# Patient Record
Sex: FEMALE | Race: WHITE | HISPANIC OR LATINO | Employment: UNEMPLOYED | URBAN - METROPOLITAN AREA
[De-identification: names, ages, dates, MRNs, and addresses within clinical notes are randomized per-mention and may not be internally consistent; named-entity substitution may affect disease eponyms.]

---

## 2017-05-02 PROBLEM — O99.820 GBS (GROUP B STREPTOCOCCUS CARRIER), +RV CULTURE, CURRENTLY PREGNANT: Status: ACTIVE | Noted: 2017-05-02

## 2017-05-02 PROBLEM — Z30.09 FAMILY PLANNING: Status: ACTIVE | Noted: 2017-05-02

## 2017-05-16 ENCOUNTER — ANESTHESIA EVENT (OUTPATIENT)
Dept: OBSTETRICS AND GYNECOLOGY | Facility: HOSPITAL | Age: 32
End: 2017-05-16
Payer: MEDICAID

## 2017-05-16 ENCOUNTER — ANESTHESIA (OUTPATIENT)
Dept: OBSTETRICS AND GYNECOLOGY | Facility: HOSPITAL | Age: 32
End: 2017-05-16
Payer: MEDICAID

## 2017-05-16 ENCOUNTER — SURGERY (OUTPATIENT)
Age: 32
End: 2017-05-16

## 2017-05-16 ENCOUNTER — HOSPITAL ENCOUNTER (INPATIENT)
Facility: HOSPITAL | Age: 32
LOS: 3 days | Discharge: HOME | End: 2017-05-19
Attending: OBSTETRICS & GYNECOLOGY | Admitting: OBSTETRICS & GYNECOLOGY
Payer: MEDICAID

## 2017-05-16 DIAGNOSIS — Z34.83 PRENATAL CARE, SUBSEQUENT PREGNANCY, THIRD TRIMESTER: ICD-10-CM

## 2017-05-16 DIAGNOSIS — Z98.891 S/P C-SECTION: Primary | ICD-10-CM

## 2017-05-16 PROBLEM — Z34.80 PRENATAL CARE, SUBSEQUENT PREGNANCY: Status: ACTIVE | Noted: 2017-05-16

## 2017-05-16 LAB
ABO + RH BLD: NORMAL
BASOPHILS # BLD AUTO: 0.02 K/UL
BASOPHILS # BLD AUTO: 0.02 K/UL
BASOPHILS NFR BLD: 0.2 %
BASOPHILS NFR BLD: 0.2 %
BLD GP AB SCN CELLS X3 SERPL QL: NORMAL
DIFFERENTIAL METHOD: ABNORMAL
DIFFERENTIAL METHOD: ABNORMAL
EOSINOPHIL # BLD AUTO: 0.2 K/UL
EOSINOPHIL # BLD AUTO: 0.4 K/UL
EOSINOPHIL NFR BLD: 1.7 %
EOSINOPHIL NFR BLD: 4.8 %
ERYTHROCYTE [DISTWIDTH] IN BLOOD BY AUTOMATED COUNT: 14.8 %
ERYTHROCYTE [DISTWIDTH] IN BLOOD BY AUTOMATED COUNT: 14.9 %
HCT VFR BLD AUTO: 26.9 %
HCT VFR BLD AUTO: 29.9 %
HGB BLD-MCNC: 8.7 G/DL
HGB BLD-MCNC: 9.7 G/DL
LYMPHOCYTES # BLD AUTO: 1.4 K/UL
LYMPHOCYTES # BLD AUTO: 1.9 K/UL
LYMPHOCYTES NFR BLD: 12.4 %
LYMPHOCYTES NFR BLD: 22.8 %
MCH RBC QN AUTO: 26.3 PG
MCH RBC QN AUTO: 26.3 PG
MCHC RBC AUTO-ENTMCNC: 32.3 %
MCHC RBC AUTO-ENTMCNC: 32.4 %
MCV RBC AUTO: 81 FL
MCV RBC AUTO: 81 FL
MONOCYTES # BLD AUTO: 0.6 K/UL
MONOCYTES # BLD AUTO: 0.7 K/UL
MONOCYTES NFR BLD: 6.5 %
MONOCYTES NFR BLD: 7.1 %
NEUTROPHILS # BLD AUTO: 5.5 K/UL
NEUTROPHILS # BLD AUTO: 8.9 K/UL
NEUTROPHILS NFR BLD: 64.4 %
NEUTROPHILS NFR BLD: 78.6 %
PLATELET # BLD AUTO: 224 K/UL
PLATELET # BLD AUTO: 247 K/UL
PMV BLD AUTO: 11.8 FL
PMV BLD AUTO: 12 FL
RBC # BLD AUTO: 3.31 M/UL
RBC # BLD AUTO: 3.69 M/UL
RPR SER QL: NORMAL
WBC # BLD AUTO: 11.3 K/UL
WBC # BLD AUTO: 8.48 K/UL

## 2017-05-16 PROCEDURE — 59514 CESAREAN DELIVERY ONLY: CPT | Mod: CRNA,,, | Performed by: NURSE ANESTHETIST, CERTIFIED REGISTERED

## 2017-05-16 PROCEDURE — 27200918 HC ALEXIS O RING

## 2017-05-16 PROCEDURE — 86900 BLOOD TYPING SEROLOGIC ABO: CPT

## 2017-05-16 PROCEDURE — 51702 INSERT TEMP BLADDER CATH: CPT

## 2017-05-16 PROCEDURE — 59514 CESAREAN DELIVERY ONLY: CPT | Mod: ANES,,, | Performed by: ANESTHESIOLOGY

## 2017-05-16 PROCEDURE — 36000685 HC CESAREAN SECTION LEVEL I

## 2017-05-16 PROCEDURE — 25000003 PHARM REV CODE 250: Performed by: ANESTHESIOLOGY

## 2017-05-16 PROCEDURE — 63600175 PHARM REV CODE 636 W HCPCS: Performed by: NURSE ANESTHETIST, CERTIFIED REGISTERED

## 2017-05-16 PROCEDURE — 88302 TISSUE EXAM BY PATHOLOGIST: CPT | Performed by: PATHOLOGY

## 2017-05-16 PROCEDURE — 0UB70ZZ EXCISION OF BILATERAL FALLOPIAN TUBES, OPEN APPROACH: ICD-10-PCS | Performed by: OBSTETRICS & GYNECOLOGY

## 2017-05-16 PROCEDURE — 3E033VJ INTRODUCTION OF OTHER HORMONE INTO PERIPHERAL VEIN, PERCUTANEOUS APPROACH: ICD-10-PCS | Performed by: OBSTETRICS & GYNECOLOGY

## 2017-05-16 PROCEDURE — 63600175 PHARM REV CODE 636 W HCPCS: Performed by: ANESTHESIOLOGY

## 2017-05-16 PROCEDURE — 86592 SYPHILIS TEST NON-TREP QUAL: CPT

## 2017-05-16 PROCEDURE — 25000003 PHARM REV CODE 250: Performed by: NURSE ANESTHETIST, CERTIFIED REGISTERED

## 2017-05-16 PROCEDURE — 37000008 HC ANESTHESIA 1ST 15 MINUTES: Performed by: OBSTETRICS & GYNECOLOGY

## 2017-05-16 PROCEDURE — 25000003 PHARM REV CODE 250: Performed by: OBSTETRICS & GYNECOLOGY

## 2017-05-16 PROCEDURE — 36415 COLL VENOUS BLD VENIPUNCTURE: CPT

## 2017-05-16 PROCEDURE — 88302 TISSUE EXAM BY PATHOLOGIST: CPT | Mod: 26,,, | Performed by: PATHOLOGY

## 2017-05-16 PROCEDURE — 63600175 PHARM REV CODE 636 W HCPCS: Performed by: OBSTETRICS & GYNECOLOGY

## 2017-05-16 PROCEDURE — 37000009 HC ANESTHESIA EA ADD 15 MINS: Performed by: OBSTETRICS & GYNECOLOGY

## 2017-05-16 PROCEDURE — 27201108 HC SURGICEL

## 2017-05-16 PROCEDURE — 11000001 HC ACUTE MED/SURG PRIVATE ROOM

## 2017-05-16 PROCEDURE — 63600175 PHARM REV CODE 636 W HCPCS

## 2017-05-16 PROCEDURE — 86901 BLOOD TYPING SEROLOGIC RH(D): CPT

## 2017-05-16 PROCEDURE — 27200688 HC TRAY, SPINAL-HYPER/ ISOBARIC: Performed by: ANESTHESIOLOGY

## 2017-05-16 PROCEDURE — 85025 COMPLETE CBC W/AUTO DIFF WBC: CPT

## 2017-05-16 RX ORDER — OXYCODONE AND ACETAMINOPHEN 10; 325 MG/1; MG/1
1 TABLET ORAL EVERY 4 HOURS PRN
Status: DISCONTINUED | OUTPATIENT
Start: 2017-05-16 | End: 2017-05-19 | Stop reason: HOSPADM

## 2017-05-16 RX ORDER — FAMOTIDINE 10 MG/ML
20 INJECTION INTRAVENOUS ONCE
Status: COMPLETED | OUTPATIENT
Start: 2017-05-16 | End: 2017-05-16

## 2017-05-16 RX ORDER — MIDAZOLAM HYDROCHLORIDE 1 MG/ML
INJECTION INTRAMUSCULAR; INTRAVENOUS
Status: DISCONTINUED | OUTPATIENT
Start: 2017-05-16 | End: 2017-05-16

## 2017-05-16 RX ORDER — SODIUM CHLORIDE, SODIUM LACTATE, POTASSIUM CHLORIDE, CALCIUM CHLORIDE 600; 310; 30; 20 MG/100ML; MG/100ML; MG/100ML; MG/100ML
INJECTION, SOLUTION INTRAVENOUS CONTINUOUS
Status: CANCELLED | OUTPATIENT
Start: 2017-05-16 | End: 2017-05-17

## 2017-05-16 RX ORDER — AMOXICILLIN 250 MG
1 CAPSULE ORAL NIGHTLY PRN
Status: DISCONTINUED | OUTPATIENT
Start: 2017-05-16 | End: 2017-05-19 | Stop reason: HOSPADM

## 2017-05-16 RX ORDER — SODIUM CHLORIDE, SODIUM LACTATE, POTASSIUM CHLORIDE, CALCIUM CHLORIDE 600; 310; 30; 20 MG/100ML; MG/100ML; MG/100ML; MG/100ML
INJECTION, SOLUTION INTRAVENOUS CONTINUOUS
Status: DISCONTINUED | OUTPATIENT
Start: 2017-05-16 | End: 2017-05-19 | Stop reason: HOSPADM

## 2017-05-16 RX ORDER — HYDROMORPHONE HYDROCHLORIDE 2 MG/ML
INJECTION, SOLUTION INTRAMUSCULAR; INTRAVENOUS; SUBCUTANEOUS
Status: COMPLETED
Start: 2017-05-16 | End: 2017-05-16

## 2017-05-16 RX ORDER — SIMETHICONE 80 MG
1 TABLET,CHEWABLE ORAL EVERY 6 HOURS PRN
Status: DISCONTINUED | OUTPATIENT
Start: 2017-05-16 | End: 2017-05-19 | Stop reason: HOSPADM

## 2017-05-16 RX ORDER — OXYTOCIN/RINGER'S LACTATE 20/1000 ML
41.65 PLASTIC BAG, INJECTION (ML) INTRAVENOUS CONTINUOUS
Status: DISPENSED | OUTPATIENT
Start: 2017-05-16 | End: 2017-05-16

## 2017-05-16 RX ORDER — PHENYLEPHRINE HYDROCHLORIDE 10 MG/ML
INJECTION INTRAVENOUS
Status: DISCONTINUED | OUTPATIENT
Start: 2017-05-16 | End: 2017-05-16

## 2017-05-16 RX ORDER — ONDANSETRON 2 MG/ML
4 INJECTION INTRAMUSCULAR; INTRAVENOUS EVERY 12 HOURS PRN
Status: DISCONTINUED | OUTPATIENT
Start: 2017-05-16 | End: 2017-05-17

## 2017-05-16 RX ORDER — BISACODYL 10 MG
10 SUPPOSITORY, RECTAL RECTAL ONCE AS NEEDED
Status: ACTIVE | OUTPATIENT
Start: 2017-05-17 | End: 2017-05-17

## 2017-05-16 RX ORDER — HYDROCORTISONE 25 MG/G
CREAM TOPICAL 3 TIMES DAILY PRN
Status: DISCONTINUED | OUTPATIENT
Start: 2017-05-16 | End: 2017-05-19 | Stop reason: HOSPADM

## 2017-05-16 RX ORDER — MISOPROSTOL 200 UG/1
800 TABLET ORAL
Status: DISCONTINUED | OUTPATIENT
Start: 2017-05-16 | End: 2017-05-19 | Stop reason: HOSPADM

## 2017-05-16 RX ORDER — OXYTOCIN 10 [USP'U]/ML
INJECTION, SOLUTION INTRAMUSCULAR; INTRAVENOUS
Status: DISCONTINUED | OUTPATIENT
Start: 2017-05-16 | End: 2017-05-16

## 2017-05-16 RX ORDER — ACETAMINOPHEN 10 MG/ML
1000 INJECTION, SOLUTION INTRAVENOUS ONCE
Status: COMPLETED | OUTPATIENT
Start: 2017-05-16 | End: 2017-05-16

## 2017-05-16 RX ORDER — METOCLOPRAMIDE 10 MG/1
10 TABLET ORAL EVERY 6 HOURS PRN
Status: DISCONTINUED | OUTPATIENT
Start: 2017-05-16 | End: 2017-05-17

## 2017-05-16 RX ORDER — METOCLOPRAMIDE HYDROCHLORIDE 5 MG/ML
10 INJECTION INTRAMUSCULAR; INTRAVENOUS ONCE
Status: COMPLETED | OUTPATIENT
Start: 2017-05-16 | End: 2017-05-16

## 2017-05-16 RX ORDER — IBUPROFEN 600 MG/1
600 TABLET ORAL EVERY 6 HOURS
Status: DISCONTINUED | OUTPATIENT
Start: 2017-05-16 | End: 2017-05-19 | Stop reason: HOSPADM

## 2017-05-16 RX ORDER — NALOXONE HCL 0.4 MG/ML
0.02 VIAL (ML) INJECTION
Status: DISCONTINUED | OUTPATIENT
Start: 2017-05-16 | End: 2017-05-17

## 2017-05-16 RX ORDER — FENTANYL CITRATE 50 UG/ML
INJECTION, SOLUTION INTRAMUSCULAR; INTRAVENOUS
Status: DISCONTINUED | OUTPATIENT
Start: 2017-05-16 | End: 2017-05-16

## 2017-05-16 RX ORDER — ADHESIVE BANDAGE
30 BANDAGE TOPICAL 2 TIMES DAILY PRN
Status: DISCONTINUED | OUTPATIENT
Start: 2017-05-17 | End: 2017-05-19 | Stop reason: HOSPADM

## 2017-05-16 RX ORDER — OXYCODONE AND ACETAMINOPHEN 5; 325 MG/1; MG/1
1 TABLET ORAL EVERY 4 HOURS PRN
Status: DISCONTINUED | OUTPATIENT
Start: 2017-05-16 | End: 2017-05-19 | Stop reason: HOSPADM

## 2017-05-16 RX ORDER — DIPHENHYDRAMINE HCL 25 MG
25 CAPSULE ORAL EVERY 4 HOURS PRN
Status: DISCONTINUED | OUTPATIENT
Start: 2017-05-16 | End: 2017-05-19 | Stop reason: HOSPADM

## 2017-05-16 RX ORDER — ONDANSETRON 8 MG/1
8 TABLET, ORALLY DISINTEGRATING ORAL EVERY 8 HOURS PRN
Status: DISCONTINUED | OUTPATIENT
Start: 2017-05-16 | End: 2017-05-19 | Stop reason: HOSPADM

## 2017-05-16 RX ORDER — HYDROMORPHONE HCL IN 0.9% NACL 6 MG/30 ML
PATIENT CONTROLLED ANALGESIA SYRINGE INTRAVENOUS CONTINUOUS
Status: DISCONTINUED | OUTPATIENT
Start: 2017-05-16 | End: 2017-05-17

## 2017-05-16 RX ORDER — PROPOFOL 10 MG/ML
VIAL (ML) INTRAVENOUS
Status: DISCONTINUED | OUTPATIENT
Start: 2017-05-16 | End: 2017-05-16

## 2017-05-16 RX ORDER — DOCUSATE SODIUM 100 MG/1
200 CAPSULE, LIQUID FILLED ORAL 2 TIMES DAILY
Status: DISCONTINUED | OUTPATIENT
Start: 2017-05-16 | End: 2017-05-19 | Stop reason: HOSPADM

## 2017-05-16 RX ORDER — SODIUM CITRATE AND CITRIC ACID MONOHYDRATE 334; 500 MG/5ML; MG/5ML
30 SOLUTION ORAL ONCE
Status: COMPLETED | OUTPATIENT
Start: 2017-05-16 | End: 2017-05-16

## 2017-05-16 RX ORDER — CEFAZOLIN SODIUM 2 G/50ML
2 SOLUTION INTRAVENOUS
Status: COMPLETED | OUTPATIENT
Start: 2017-05-16 | End: 2017-05-17

## 2017-05-16 RX ORDER — DIPHENHYDRAMINE HYDROCHLORIDE 50 MG/ML
12.5 INJECTION INTRAMUSCULAR; INTRAVENOUS EVERY 4 HOURS PRN
Status: DISCONTINUED | OUTPATIENT
Start: 2017-05-16 | End: 2017-05-17

## 2017-05-16 RX ORDER — BUPIVACAINE HYDROCHLORIDE 7.5 MG/ML
INJECTION, SOLUTION INTRASPINAL
Status: DISCONTINUED | OUTPATIENT
Start: 2017-05-16 | End: 2017-05-16

## 2017-05-16 RX ADMIN — CEFAZOLIN SODIUM 3 G: 2 SOLUTION INTRAVENOUS at 08:05

## 2017-05-16 RX ADMIN — METOCLOPRAMIDE 10 MG: 10 TABLET ORAL at 09:05

## 2017-05-16 RX ADMIN — SODIUM CHLORIDE, SODIUM LACTATE, POTASSIUM CHLORIDE, AND CALCIUM CHLORIDE: .6; .31; .03; .02 INJECTION, SOLUTION INTRAVENOUS at 09:05

## 2017-05-16 RX ADMIN — Medication: at 12:05

## 2017-05-16 RX ADMIN — FAMOTIDINE 20 MG: 10 INJECTION, SOLUTION INTRAVENOUS at 08:05

## 2017-05-16 RX ADMIN — CEFAZOLIN SODIUM 2 G: 2 SOLUTION INTRAVENOUS at 10:05

## 2017-05-16 RX ADMIN — BUPIVACAINE HYDROCHLORIDE IN DEXTROSE 1.6 ML: 7.5 INJECTION, SOLUTION SUBARACHNOID at 09:05

## 2017-05-16 RX ADMIN — FENTANYL CITRATE 10 MCG: 50 INJECTION, SOLUTION INTRAMUSCULAR; INTRAVENOUS at 09:05

## 2017-05-16 RX ADMIN — PHENYLEPHRINE HYDROCHLORIDE 200 MCG: 10 INJECTION INTRAVENOUS at 10:05

## 2017-05-16 RX ADMIN — PHENYLEPHRINE HYDROCHLORIDE 100 MCG: 10 INJECTION INTRAVENOUS at 10:05

## 2017-05-16 RX ADMIN — PROPOFOL 30 MG: 10 INJECTION, EMULSION INTRAVENOUS at 10:05

## 2017-05-16 RX ADMIN — SODIUM CHLORIDE, SODIUM LACTATE, POTASSIUM CHLORIDE, AND CALCIUM CHLORIDE: .6; .31; .03; .02 INJECTION, SOLUTION INTRAVENOUS at 10:05

## 2017-05-16 RX ADMIN — CEFAZOLIN SODIUM 2 G: 2 SOLUTION INTRAVENOUS at 04:05

## 2017-05-16 RX ADMIN — HYDROMORPHONE HYDROCHLORIDE 0.6 MG: 2 INJECTION, SOLUTION INTRAMUSCULAR; INTRAVENOUS; SUBCUTANEOUS at 11:05

## 2017-05-16 RX ADMIN — HYDROMORPHONE HYDROCHLORIDE 0.2 MG: 2 INJECTION, SOLUTION INTRAMUSCULAR; INTRAVENOUS; SUBCUTANEOUS at 11:05

## 2017-05-16 RX ADMIN — IBUPROFEN 600 MG: 600 TABLET, FILM COATED ORAL at 11:05

## 2017-05-16 RX ADMIN — SODIUM CITRATE AND CITRIC ACID MONOHYDRATE 30 ML: 500; 334 SOLUTION ORAL at 08:05

## 2017-05-16 RX ADMIN — SODIUM CHLORIDE, SODIUM LACTATE, POTASSIUM CHLORIDE, AND CALCIUM CHLORIDE 1000 ML: .6; .31; .03; .02 INJECTION, SOLUTION INTRAVENOUS at 05:05

## 2017-05-16 RX ADMIN — PHENYLEPHRINE HYDROCHLORIDE 100 MCG: 10 INJECTION INTRAVENOUS at 09:05

## 2017-05-16 RX ADMIN — ACETAMINOPHEN 1000 MG: 10 INJECTION, SOLUTION INTRAVENOUS at 12:05

## 2017-05-16 RX ADMIN — MIDAZOLAM HYDROCHLORIDE 2 MG: 1 INJECTION, SOLUTION INTRAMUSCULAR; INTRAVENOUS at 10:05

## 2017-05-16 RX ADMIN — ONDANSETRON 8 MG: 8 TABLET, ORALLY DISINTEGRATING ORAL at 03:05

## 2017-05-16 RX ADMIN — METOCLOPRAMIDE 10 MG: 5 INJECTION, SOLUTION INTRAMUSCULAR; INTRAVENOUS at 08:05

## 2017-05-16 RX ADMIN — HYDROMORPHONE HYDROCHLORIDE 0.6 MG: 2 INJECTION, SOLUTION INTRAMUSCULAR; INTRAVENOUS; SUBCUTANEOUS at 10:05

## 2017-05-16 RX ADMIN — Medication 41.65 MILLI-UNITS/MIN: at 12:05

## 2017-05-16 RX ADMIN — OXYTOCIN 30 UNITS: 10 INJECTION, SOLUTION INTRAMUSCULAR; INTRAVENOUS at 10:05

## 2017-05-16 RX ADMIN — SODIUM CHLORIDE, SODIUM LACTATE, POTASSIUM CHLORIDE, AND CALCIUM CHLORIDE: .6; .31; .03; .02 INJECTION, SOLUTION INTRAVENOUS at 07:05

## 2017-05-16 RX ADMIN — FENTANYL CITRATE 100 MCG: 50 INJECTION, SOLUTION INTRAMUSCULAR; INTRAVENOUS at 10:05

## 2017-05-16 NOTE — H&P (VIEW-ONLY)
History and Physical - Obstetrics    Subjective:     Patient is a 31 y.o.  @ 39w0d gestational age with an Estimated Date of Delivery: 17, who presents for pre-op for /BTL for breech presentation and desire for sterility. Fetal Movement: normal.    Her current obstetrical history is significant for breech presentation, desire for sterility, GBS+ and grand multiparity.    Review of Systems  Nine system ROS negative except for HPI      (Not in a hospital admission)    Review of patient's allergies indicates:  No Known Allergies     No past medical history on file.    No past surgical history on file.    OB History      Para Term  AB TAB SAB Ectopic Multiple Living    5 4 4       4        Obstetric Comments    Transfusion with G1 postpartum due to anemia          Social History     Social History    Marital status: Single     Spouse name: N/A    Number of children: N/A    Years of education: N/A     Occupational History    Not on file.     Social History Main Topics    Smoking status: Never Smoker    Smokeless tobacco: Not on file    Alcohol use No    Drug use: No    Sexual activity: Yes     Partners: Male     Other Topics Concern    Not on file     Social History Narrative       Family History   Problem Relation Age of Onset    Breast cancer Neg Hx     Colon cancer Neg Hx     Ovarian cancer Neg Hx        Objective:   Vital Signs (Most Recent)  BP: 104/70 (05/15/17 1003)  Fetal Heart Tones: 145    General: no acute distress, alert and oriented to person, place and time  Abdomen: gravid, no palpable contractions  Incision(s): none  Extremities: calves non-tender to palpation, no calf edema, erythema or palpable cords  Cervix: 2.5/30%/-2, complete breech    Laboratory: see results console    Assessment:     30 yo  @ 39w0d gestational age with breech presentation and desire for stility    Plan:     To the OR for primary /BTL  Anecf  SCDs

## 2017-05-16 NOTE — L&D DELIVERY NOTE
Primary  Delivery Note    Date of Procedure: 2017    Surgeon: Franny Astorga MD    Procedure:  1. Primary  Delivery via Low Transverse Uterine Incision and Pfannensteil Incision  2. Bilateral tubal ligation via modified parkland    Pre-operative Diagnosis:   1. IUP @ 39w1d  2. Breech presentation  3. Desire for permanent sterilization    Post-operative Diagnosis: Same    Anesthesia: Spinal    Estimated Blood Loss: 800 cc    Specimen: segments of right and left fallopian tubes    Complications: None    Findings: Female infant in the vertex presentation; Apgar 7/9; weight 3645 g; Normal uterus, tubes and ovaries.    Indication and Consent: Patient presented to labor and delivery and was admitted for primary /BTL for breech presentation and desire for sterility.  CD recommended for fetal and maternal well being.  Risks, benefits and alternatives discussed, including, but not limited to: visceral and vascular injury, infection, blood loss, need for blood transfusion, prolonged hospitalization, and reoperation.  The patient stated understanding and desire to proceed. All questions were answered. The patient desires permanent sterilization. All other forms of contraception have been discussed with her, including the most effective: barrier protection, estrogen and progesterone methods, progesterone-only methods, copper IUD, and male sterilization (vasectomy).  She understands that our office performs two types of female sterilization: interval laparoscopic bilateral tubal fulguration (BTF) and intracervical fallopian tube occulsion (ESSURE).  She understands the risks, benefits and alternatives of this procedure, including but not limited to: bleeding, infection, damage to surrounding tissue(s), regret, ectopic pregnancy, failure resulting in intra-uterine pregnancy, need for additional procedures (including HSG with ESSURE) and backup contraception for at least 3 months with the ESSURE; that  this procedure is intended to be permanent; if she is under the age of 30 her risk of regret is 20%.  She understands that at any time she may change her mind, and this will not impact her care.    Procedure: Patient taken to the operating room with IVFs running.  SCDs placed for VTE prophylaxis.  Ancef was given for infection prophylaxis.  Spinal anesthesia was obtained.  A kaufman catheter was placed to drain her bladder.  She was prepped and draped in normal sterile fashion in the dorsal supine position.  A Pfannensteil skin incision made with the scalpel.  This incision was carried down to the fascia with the Bovie.  The fascia was incised and this incision was extended laterally with curved joshi scissors.  Kocher clamps were used to grasp the superior edge of the fascia and the underlying pyramidalis and rectus abdominus muscles were dissected off sharply with the curved joshi scissors.  Hemostasis was achieved with the Bovie.  The inferior edge of the fascia was grasped with the Kocher clamps and in a similar fashion, the underlying muscles were dissected off.  The muscles were  down the midline to the level of the pubic symphysis.  The peritoneum was identified and entered bluntly in an area free of any adherent bowel or bladder, and this opening was extended with gentle lateral traction.  A bladder blade was then placed into the abdomen.  A bladder flap was created by dissecting the vesicouterine peritoneum away from the lower uterine segment.  The bladder blade was removed and an Erasto retractor was placed into the abdomen.  The lower uterine segment was incised in a transverse fashion, and this incision was extended laterally with blunt dissection.  The fetus was found in the breech presentation.  The sacrum was elevated out of the pelvis and gently brought to the uterine incision.  The trunk followed by both arms were delivered, double nuchal cord noted.  Holding the head in a flexed position, the  head was gently delivered with gentle fundal pressure.  Double nuchal cord reduced.  The mouth and nose were bulb suctioned.  The cord was clamped x2 and cut.  The infant was handed to the awaiting  team.  Cord blood was obtained.  The placenta was delivered spontaneously, intact.  IV Pitocin was administered to help facilitate uterine contractions.  The uterus, tubes and ovaries were exteriorized and found to be normal.  A laparotomy sponge was used to wipe the inside of the uterus to help remove any remaining placental membranes.  The uterus was then closed with #1 chromic in a locking fashion.  A second imbricating layer with this same suture was then performed.  Both uterine angles were bleeding and bilateral uterine artery ligations were performed with good hemostasis noted.    Attention was then turned to the fallopian tubes.  The right fallopian tube was grasped with a Mayo clamp and the mesosalpinx was opened in an area found to be free of any vasculature with the Bovie.  2-0 plain gut suture was used to double ligate the distal and proximal portion of the tube.  The segment of tube between was cut and sent to Pathology.  The remaining tube was inspected and found to be hemostatic.  In a similar fashion, a portion of the left fallopian tube was ligated.    The uterine incision was inspected and found to be hemostatic.  A laparotomy sponge was used to wipe the blood clots and fluid in the abdomen and pelvis.  The uterus, tubes and ovaries were then placed back into the abdominal cavity.  The uterine incision was re-inspected and continued to be hemostatic.  The Erasto retractor was removed.  Surgicel was placed over the right aspect of the uterine incision.  The peritoneum was closed with 2-0 vicryl in a continuous fashion.  The muscle was reapproximated with U-stiches and 2-0 vicryl.  The fascia was closed in a running fashion using 0 vicryl.  The subcutaneous tissue was closed with 2-0 plain gut.   The skin was reapproximated with Insorb absorbable staples.    The instrument and sponge count were correct x2.  The patient tolerated the procedure well and was transferred to the next level of care in stable condition.

## 2017-05-16 NOTE — PLAN OF CARE
Problem: Patient Care Overview  Goal: Plan of Care Review  Outcome: Ongoing (interventions implemented as appropriate)  Patient in stable condition. Verbalizes understanding of care plan with good recall.

## 2017-05-16 NOTE — ANESTHESIA PREPROCEDURE EVALUATION
05/16/2017  Rachel Guerrero is a 31 y.o., female.    Anesthesia Evaluation    I have reviewed the Patient Summary Reports.     I have reviewed the Medications.     Review of Systems  Anesthesia Hx:  No previous Anesthesia    Social:  Non-Smoker, No Alcohol Use    Cardiovascular:   Exercise tolerance: good        Physical Exam  General:  Well nourished    Airway/Jaw/Neck:  Airway Findings: Mouth Opening: Normal Tongue: Normal  General Airway Assessment: Adult  Mallampati: II  TM Distance: Normal, at least 6 cm  Jaw/Neck Findings:  Neck ROM: Normal ROM      Dental:  Dental Findings: In tact   Chest/Lungs:  Chest/Lungs Findings: Clear to auscultation, Normal Respiratory Rate     Heart/Vascular:  Heart Findings: Rate: Normal        Mental Status:  Mental Status Findings:  Cooperative, Alert and Oriented         Anesthesia Plan  Type of Anesthesia, risks & benefits discussed:  Anesthesia Type:  spinal  Patient's Preference:   Intra-op Monitoring Plan: standard ASA monitors  Intra-op Monitoring Plan Comments:   Post Op Pain Control Plan: PCA  Post Op Pain Control Plan Comments:   Induction:   IV  Beta Blocker:  Patient is not currently on a Beta-Blocker (No further documentation required).       Informed Consent: Patient understands risks and agrees with Anesthesia plan.  Questions answered. Anesthesia consent signed with patient.  ASA Score: 2     Day of Surgery Review of History & Physical:    H&P update referred to the surgeon.         Ready For Surgery From Anesthesia Perspective.

## 2017-05-16 NOTE — IP AVS SNAPSHOT
[place] [address] [place] [address]  Phone: [phone]           Instrucciones de [place] de Pacientes  Nuestra meta es prepararlo para el éxito. Smiley paquete incluye información sobre bloom condición, medicamentos e instrucciones para cuidados en el hogar. [place] le ayudará a cuidarse y prevenir la necesidad de volver al hospital.     Por favor, hable con bloom enfermero o enfermera si tiene alguna pregunta..     Hay muchos detalles a recordar cuando se prepara para salir del hospital. [place] es lo que necesita hacer:    1. [place] bloom medicina. Si usted tiene camille receta, revise la lista de medicamentos en las siguientes páginas. Es posible que tenga nuevos medicamentos para recoger en la farmacia y otros que tendrá que dejar de leatha. Revise las instrucciones sobre cómo y cuándo leatha varsha medicamentos. Consulte con el médico o el enfermeras si no está seguro de qué hacer.    2. Ir a varsha citas de seguimiento. La información específica de seguimiento aparece en las siguientes páginas. Usted puede ser contactado por camille enfermera o proveedor clínico sobre las próximas citas. Estar seguro de que tiene todos los números de teléfono para comunicarse si es necesario. Por favor, póngase en contacto con la oficina de bloom profesional médico si no puede hacer camille yun.     3. Esté atento a señales de advertencia. El médico o la enfermera le dará señales de alarma detallados que debe observar y cuándo llamar para la ayuda. Estas instrucciones también pueden incluir información educativa acerca de bloom condición. Si usted experimenta cualquiera de las señales de advertencia para bloom mahogany, llame a bloom médico.           ** Verificar la lista de medicamentos es correcta y está actualizada. Llevar esto con usted en amanda de emergencia. Si varsha medicamentos miranda cambiado, por favor notifique a bloom proveedor de atención médica.             Lista de medicamentos      EMPIECE a leatha estos medicamentos        Additional  Info                      docusate sodium 100 MG capsule   También conocido guille:  COLACE   Cantidad:  30 capsule   Recargas:  1   Dosis:  100 mg    Última administración:  200 mg on 5/19/2017 10:11 AM   Instrucciones:  Take 1 capsule (100 mg total) by mouth daily as needed for Constipation.     Begin Date    AM    Noon    PM    Bedtime       ferrous sulfate 325 (65 FE) MG EC tablet   Cantidad:  60 tablet   Recargas:  3   Dosis:  325 mg    Instrucciones:  Take 1 tablet (325 mg total) by mouth 2 (two) times daily.     Begin Date    AM    Noon    PM    Bedtime       ibuprofen 600 MG tablet   También conocido guille:  ADVIL,MOTRIN   Cantidad:  30 tablet   Recargas:  1   Dosis:  600 mg    Última administración:  600 mg on 5/19/2017  6:16 AM   Instrucciones:  Take 1 tablet (600 mg total) by mouth every 6 (six) hours.     Begin Date    AM    Noon    PM    Bedtime       oxycodone-acetaminophen 5-325 mg per tablet   También conocido guille:  PERCOCET   Cantidad:  30 tablet   Recargas:  0   Dosis:  1 tablet    Última administración:  1 tablet on 5/18/2017  8:58 PM   Instrucciones:  Take 1 tablet by mouth every 4 (four) hours as needed.     Begin Date    AM    Noon    PM    Bedtime         SIGA tomando estos medicamentos        Additional Info                      prenatal vit#103-iron-FA 27 mg iron- 1 mg Tab   También conocido guille:     Cantidad:  30 tablet   Recargas:  12   Dosis:  1 tablet    Instrucciones:  Take 1 tablet by mouth once daily.     Begin Date    AM    Noon    PM    Bedtime            Dónde puede recoger varsha medicamentos      Estos medicamentos fueron enviados a [facility] [id number] - [place] [address] [facility] [address] [facility] [address] [facility] [place] [address]     Teléfono:  [phone]     docusate sodium 100 MG capsule    ferrous sulfate 325 (65 FE) MG EC tablet    ibuprofen 600 MG tablet    oxycodone-acetaminophen 5-325 mg per tablet                  Por favor traiga a  todos las citas de seguimiento:    1. Yara copia de las instrucciones de cheikh.  2. Todos los medicamentos que está tomando shayne momento, en nannette envases originales.  3. Identificación y tarjeta de seguro.    Por favor llegue 15 minutos antes de la hora de la yun.    Por favor llame con 24 horas de antelación si tiene que cambiar bloom yun y / o tiempo.        Nannette Citas Programadas     May 24, 2017  2:55 PM CDT   Post Delivery with Franny Astorga MD   [facility]    [address]  [place] [address]   [phone]              Información de seguimiento     Realice un seguimiento con:  Franny Astorga MD    Cuándo:  5/26/2017    Especialidad:  Obstetrics and Gynecology    Información de contacto:    [address]  [address]  [place] [address]  [phone]          Realice un seguimiento con:  Franyn Astorga MD    Cuándo:  5/26/2017    Especialidad:  Obstetrics and Gynecology    Por qué:  For wound re-check    Información de contacto:    [address] [place] [address] [address]  [address]  [place] [address]  [phone]            Instrucciones a simone de cheikh       Management of Engorgement in the Non-Nursing Mother    Your breast milk will usually come in within 3-5 days after delivery even if you have decided not to breastfeed.  Your breasts may become full, lumpy, hard and uncomfortable due to the glands filling with milk and swelling of the breast tissue, blood vessels, and lymph glands.  This is a temporary condition usually lasting 24-48 hrs.  If your breasts become uncomfortable during this time, you may use some or all of the comfort measures described below to obtain relief.    Measures to relieve discomfort:    1. Wear a well fitting supportive bra. It should not be too tight or it may lead to plugged ducts or a breast infection.  (We do not recommend that you bind your breasts with any type of wrap.)    2. If the breasts begin to feel heavy, warm or uncomfortably full, begin using cold compresses on your  breasts. Cold compresses can relieve the swelling and provide comfort.  Cold application can be in the form of ice packs, gel packs, frozen bags of vegetables or frozen wet towels. Cold compresses should be wrapped in a thin towel or a pillow case and applied to the breasts for 20 minutes at a time. This process can be repeated with a short break in between the applications of cold compresses until the swelling is relieved.     3.  Cold cabbage compresses can also be used to relieve pain and swelling.  Chilled cabbage leaves show similar pain reducing effects as cold compresses.  Some mothers prefer the cabbage leaves due to a stronger, more immediate effect. Cabbage leave effects are not clinically proven but many mothers find them very soothing.    How to apply chilled cabbage compresses:  rinse with cool water and refrigerate raw cabbage leaves.  Strip the large vein off the cold cabbage leaf and put the remaining part of the cabbage leaf directly on the breast. The leaves should be worn inside the bra until they wilt, usually within 2-4 hours.  When they wilt they should be replaced with fresh leaves.   If redness, rash, or itching occur stop use immediately.    4. Anti-inflammatory medications such as ibuprofen may be taken, with your physicians approval, to reduce inflammation and discomfort.     6. If fullness persists and remains painful even after all of the above measures are used, hand expressing a small amount of milk out of the breasts can provide an extra measure of comfort.  Warm showers, letting the warm water hit your back and roll over your shoulders to the breasts, while you gently express milk can make hand expressing a little easier. You should only remove enough milk to provide comfort and relief.   Repeat this process as often as needed to keep the breasts comfortable.    7. You can pump your breasts and remove just enough milk to feel softer, but not so much that more milk production is  "stimulated.   Repeat this process as often as needed to keep the breasts comfortable.    8. There is no need to limit the amount of fluids that you drink.       9. Engorgement will usually get better within 24-48 hrs; however, there may be some fullness and/or leaking of milk for several days. Wear breast pads to absorb any leaking milk and change them frequently.    10. If you have any flu-like symptoms such as fever, chills, feeling tired and achy or red areas on your breast, call your physician immediately.  11.Call the [facility], if the engorgement is not relieved or if you have questions.    Referencias/Adjuntos de cheikh     AFTER A VAGINAL BIRTH ([place])    AFTER DELIVERY: WHEN TO CALL THE HEALTH CARE PROVIDER ([place])    BIRTH, AFTER GIVING: HOW TO FEEL HEALTHY ([place])    POSTPARTUM DEPRESSION, UNDERSTANDING ([place])        Primary Diagnosis     Bloom diagnosis primaria es:  Prenatal Care      Información de Admisión     Fecha y hora Proveedor Departamento [facility]    5/16/2017  5:05 AM Franny Astorga MD [facility] [id number]      Los proveedores de cuidado     Personal Médico Rol Especialidad Teléfono principal de la oficina    Franny Astorga MD Attending Provider Obstetrics and Gynecology [phone]    Franny Astorga MD Surgeon  Obstetrics and Gynecology [phone]      Nannette signos vitales christopher     PS Pulso Temperatura Resp [place] [employer]o    117/69 85 97 °F (36.1 °C) 17 5' 5" (1.651 m) 93 kg (205 lb)    Ultima menstruación SpO2 BMI ([facility]             08/15/2016 (Exact Date) 96% 34.11 kg/m2         Recent Lab Values     No lab values to display.      Pending Labs     Order Current Status    Specimen to Pathology - Surgery Collected (05/16/17 1134)      Alergias     A partir del:  5/19/2017        No Known Allergies      [facility]     [facility] - 24/7    Si bloom médico no le ha indicado a lo contrário, por favor contactar a [facility] [place], nuestra línea de cuidado de " enfermeras está disponible para asistirle 24/7.    Enfermeras registradas de [facility] pueden ayudarle a reservar camille yun, proveer educación para la mahogany, asesoría clínica, y otros servicios de asesoramiento.     Llame para shayne servicio gratuito a [phone].        Directiva Anticipada     Camille directiva anticipada es un documento que, en amanda de que ya no capaz de hacer decisiones por sí mismo, le dice a bloom equipo médico qué tipo de tratamiento quiere o no quiere recibir, o que le gustaría leatha esas decisiones para usted . Si actualmente no tiene camille directiva anticipada, [facility] le anima a crear samuel. Para más información llame al: [phone]), [phone]-[facility] ([phone]), o entrando en [email or web address].        Language Assistance Services     ATTENTION: Language assistance services are available, free of charge. Please call [phone].      ATENCIÓN: Si habla español, tiene a bloom disposición servicios gratuitos de asistencia lingüística. Llame al [phone].     CHÚ Ý: N?u b?n nói Ti?ng Vi?t, có các d?ch v? h? tr? ngôn ng? mi?n phí dành cho b?n. G?i s? [phone].        Registrarse para [facility]     La activación de bloom cuenta [facility] es tan fácil guille 1-2-3!    1) Ir a [email or web address], seleccione Registrarse Ahora, meter el código de activación y bloom fecha de nacimiento, y seleccione Próximo.    [id number]  Expires: 7/3/2017 10:52 AM      2) Crear un nombre de usuario y contraseña para usar cuando se visita [facility] en el futuro y selecciona camille pregunta de seguridad en amanda de que pierda bloom contraseña y seleccione Próximo.    3) Introduzca bloom dirección de correo electrónico y ho [facility] en Registrarse!    Información Adicional  Si tiene alguna pregunta, por favor, e-mail [email or web address] o ana al [phone] para hablar con nuestro personal. Recuerde, [facility] no debe ser usada para necesidades urgentes. En amanda de emergencia médica, ana monreal 911.          [facility] cumple con las leyes federales aplicables de derechos civiles y no discrimina por motivos de angela, color, origen nacional, edad, discapacidad, o sexo.                        [facility]  [place] [address]  Phone: [phone]           Patient Discharge Instructions   Our goal is to set you up for success. This packet includes information on your condition, medications, and your home care.  It will help you care for yourself to prevent having to return to the hospital.     Please ask your nurse if you have any questions.      There are many details to remember when preparing to leave the hospital. Here is what you will need to do:    1. Take your medicine. If you are prescribed medications, review your Medication List on the following pages. You may have new medications to  at the pharmacy and others that you'll need to stop taking. Review the instructions for how and when to take your medications. Talk with your doctor or nurses if you are unsure of what to do.     2. Go to your follow-up appointments. Specific follow-up information is listed in the following pages. Your may be contacted by a nurse or clinical provider about future appointments. Be sure we have all of the phone numbers to reach you. Please contact your provider's office if you are unable to make an appointment.     3. Watch for warning signs. Your doctor or nurse will give you detailed warning signs to watch for and when to call for assistance. These instructions may also include educational information about your condition. If you experience any of warning signs to your health, call your doctor.           ** Verificar la lista de medicamentos es correcta y está actualizada. Llevar esto con usted en amanda de emergencia. Si varsha medicamentos miranda cambiado, por favor notifique a bloom proveedor de atención médica.             Medication List      START taking these medications         Additional Info                      docusate sodium 100 MG capsule   Commonly known as:  COLACE   Quantity:  30 capsule   Refills:  1   Dose:  100 mg    Last time this was given:  200 mg on 5/19/2017 10:11 AM   Instructions:  Take 1 capsule (100 mg total) by mouth daily as needed for Constipation.     Begin Date    AM    Noon    PM    Bedtime       ferrous sulfate 325 (65 FE) MG EC tablet   Quantity:  60 tablet   Refills:  3   Dose:  325 mg    Instructions:  Take 1 tablet (325 mg total) by mouth 2 (two) times daily.     Begin Date    AM    Noon    PM    Bedtime       ibuprofen 600 MG tablet   Commonly known as:  ADVIL,MOTRIN   Quantity:  30 tablet   Refills:  1   Dose:  600 mg    Last time this was given:  600 mg on 5/19/2017  6:16 AM   Instructions:  Take 1 tablet (600 mg total) by mouth every 6 (six) hours.     Begin Date    AM    Noon    PM    Bedtime       oxycodone-acetaminophen 5-325 mg per tablet   Commonly known as:  PERCOCET   Quantity:  30 tablet   Refills:  0   Dose:  1 tablet    Last time this was given:  1 tablet on 5/18/2017  8:58 PM   Instructions:  Take 1 tablet by mouth every 4 (four) hours as needed.     Begin Date    AM    Noon    PM    Bedtime         CONTINUE taking these medications        Additional Info                      prenatal vit#103-iron-FA 27 mg iron- 1 mg Tab   Commonly known as:     Quantity:  30 tablet   Refills:  12   Dose:  1 tablet    Instructions:  Take 1 tablet by mouth once daily.     Begin Date    AM    Noon    PM    Bedtime            Where to Get Your Medications      These medications were sent to [facility]  [place]  [address] [facility] [address] [facility] [address] [facility] [address] [place] [address]     Phone:  [phone]     docusate sodium 100 MG capsule    ferrous sulfate 325 (65 FE) MG EC tablet    ibuprofen 600 MG tablet    oxycodone-acetaminophen 5-325 mg per tablet                  Por favor traiga a todos las citas de  seguimiento:    1. Yara copia de las instrucciones de cheikh.  2. Todos los medicamentos que está tomando shayne momento, en varsha envases originales.  3. Identificación y tarjeta de seguro.    Por favor llegue 15 minutos antes de la hora de la yun.    Por favor llame con 24 horas de antelación si tiene que cambiar bloom yun y / o tiempo.        Your Scheduled Appointments     May 24, 2017  2:55 PM CDT   Post Delivery with Franny Astorga MD   [facility]    [address]  [place] [address] [phone]              Follow-up Information     Follow up with Franny Astorga MD In 1 week.    Specialty:  Obstetrics and Gynecology    Contact information:    [address] [place] [address] [address]  [address]  [place] [address] [phone]          Follow up with Franny Astorga MD In 1 week.    Specialty:  Obstetrics and Gynecology    Why:  For wound re-check    Contact information:    [address] [place] [address] [address]  [address]  [place] [address] [phone]            Discharge Instructions       Management of Engorgement in the Non-Nursing Mother    Your breast milk will usually come in within 3-5 days after delivery even if you have decided not to breastfeed.  Your breasts may become full, lumpy, hard and uncomfortable due to the glands filling with milk and swelling of the breast tissue, blood vessels, and lymph glands.  This is a temporary condition usually lasting 24-48 hrs.  If your breasts become uncomfortable during this time, you may use some or all of the comfort measures described below to obtain relief.    Measures to relieve discomfort:    1. Wear a well fitting supportive bra. It should not be too tight or it may lead to plugged ducts or a breast infection.  (We do not recommend that you bind your breasts with any type of wrap.)    2. If the breasts begin to feel heavy, warm or uncomfortably full, begin using cold compresses on your breasts. Cold compresses can relieve the swelling and provide comfort.  Cold application  can be in the form of ice packs, gel packs, frozen bags of vegetables or frozen wet towels. Cold compresses should be wrapped in a thin towel or a pillow case and applied to the breasts for 20 minutes at a time. This process can be repeated with a short break in between the applications of cold compresses until the swelling is relieved.     3.  Cold cabbage compresses can also be used to relieve pain and swelling.  Chilled cabbage leaves show similar pain reducing effects as cold compresses.  Some mothers prefer the cabbage leaves due to a stronger, more immediate effect. Cabbage leave effects are not clinically proven but many mothers find them very soothing.    How to apply chilled cabbage compresses:  rinse with cool water and refrigerate raw cabbage leaves.  Strip the large vein off the cold cabbage leaf and put the remaining part of the cabbage leaf directly on the breast. The leaves should be worn inside the bra until they wilt, usually within 2-4 hours.  When they wilt they should be replaced with fresh leaves.   If redness, rash, or itching occur stop use immediately.    4. Anti-inflammatory medications such as ibuprofen may be taken, with your physicians approval, to reduce inflammation and discomfort.     6. If fullness persists and remains painful even after all of the above measures are used, hand expressing a small amount of milk out of the breasts can provide an extra measure of comfort.  Warm showers, letting the warm water hit your back and roll over your shoulders to the breasts, while you gently express milk can make hand expressing a little easier. You should only remove enough milk to provide comfort and relief.   Repeat this process as often as needed to keep the breasts comfortable.    7. You can pump your breasts and remove just enough milk to feel softer, but not so much that more milk production is stimulated.   Repeat this process as often as needed to keep the breasts comfortable.    8.  "There is no need to limit the amount of fluids that you drink.       9. Engorgement will usually get better within 24-48 hrs; however, there may be some fullness and/or leaking of milk for several days. Wear breast pads to absorb any leaking milk and change them frequently.    10. If you have any flu-like symptoms such as fever, chills, feeling tired and achy or red areas on your breast, call your physician immediately.  11.Call the [facility], if the engorgement is not relieved or if you have questions.    Discharge References/Attachments     AFTER A VAGINAL BIRTH ([place])    AFTER DELIVERY: WHEN TO CALL THE HEALTH CARE PROVIDER ([place])    BIRTH, AFTER GIVING: HOW TO FEEL HEALTHY ([place]ian)    POSTPARTUM DEPRESSION, UNDERSTANDING ([place]uanian)        Primary Diagnosis     Your primary diagnosis was:  Prenatal Care      Admission Information     Date & Time Provider Department CSN    5/16/2017  5:05 AM Franny Astorga MD [facility] [id number]      Care Providers     Provider Role Specialty Primary office phone    Franny Astorga MD Attending Provider Obstetrics and Gynecology [phone]    Franny Astorga MD Surgeon  Obstetrics and Gynecology [phone]      Your Vitals Were     BP Pulse Temp Resp Height Weight    117/69 85 97 °F (36.1 °C) 17 5' 5" (1.651 m) 93 kg (205 lb)    Last Period SpO2 BMI             08/15/2016 (Exact Date) 96% 34.11 kg/m2         Recent Lab Values     No lab values to display.      Pending Labs     Order Current Status    Specimen to Pathology - Surgery Collected (05/16/17 1144)      Allergies as of 5/19/2017     No Known Allergies      [facility]     [facility] Nurse Care Line - 24/7 Assistance  Unless otherwise directed by your provider, please contact [facility], our nurse care line that is available for 24/7 assistance.     Registered nurses in the [facility] provide clinical advisement, health education, appointment booking, and other " advisory services.  Call for this free service at [phone].        Advance Directives     An advance directive is a document which, in the event you are no longer able to make decisions for yourself, tells your healthcare team what kind of treatment you do or do not want to receive, or who you would like to make those decisions for you.  If you do not currently have an advance directive, [facility] encourages you to create one.  For more information call:  [phone]), [phone] ([phone]),  or log on to [email or web address].        Language Assistance Services     ATTENTION: Language assistance services are available, free of charge. Please call [phone].      ATENCIÓN: Si habla español, tiene a bloom disposición servicios gratuitos de asistencia lingüística. Llame al [phone].     CHÚ Ý: N?u b?n nói Ti?ng Vi?t, có các d?ch v? h? tr? ngôn ng? mi?n phí dành cho b?n. G?i s? [phone].        [facility] Sign-Up     Activating your [facility] account is as easy as 1-2-3!     1) Visit [email or web address], select Sign Up Now, enter this activation code and your date of birth, then select Next.  [email or web address]  Expires: 7/3/2017 10:52 AM      2) Create a username and password to use when you visit [facility] in the future and select a security question in case you lose your password and select Next.    3) Enter your e-mail address and click Sign Up!    Additional Information  If you have questions, please e-mail [email or web address] or call [phone] to talk to our [facility] staff. Remember, [facility] is NOT to be used for urgent needs. For medical emergencies, dial 911.          [facility] complies with applicable Federal civil rights laws and does not discriminate on the basis of race, color, national origin, age, disability, or sex.

## 2017-05-16 NOTE — ANESTHESIA PROCEDURE NOTES
Spinal    Diagnosis:   Patient location during procedure: OR  Start time: 2017 9:42 AM  Timeout: 2017 9:42 AM  End time: 2017 9:46 AM  Staffing  Anesthesiologist: ELVIRA PINEDO  Performed by: anesthesiologist   Preanesthetic Checklist  Completed: patient identified, site marked, surgical consent, pre-op evaluation, timeout performed, IV checked, risks and benefits discussed and monitors and equipment checked  Spinal Block  Patient position: sitting  Prep: ChloraPrep  Patient monitoring: heart rate, cardiac monitor and continuous pulse ox  Approach: midline  Location: L4-5  Injection technique: single shot  CSF Fluid: clear free-flowing CSF  Needle  Needle type: pencil-tip   Needle gauge: 25 G  Needle length: 3.5 in  Additional Documentation: incremental injection, no paresthesia on injection and negative aspiration for heme  Needle localization: anatomical landmarks  Assessment  Sensory level: T6   Dermatomal levels determined by alcohol wipe  Ease of block: easy  Patient's tolerance of the procedure: comfortable throughout block  Medications:  Bolus administered: 1.6 mL of 0.75 bupivacaine  Opioid administered: 10 mcg of   fentanyl  Additional Notes  Tuohy 17g with LORTA at 6cm, 25g jalen advanced with return of clear CSF, no heme/paresthesias

## 2017-05-16 NOTE — TREATMENT PLAN
Pt to unit for scheduled primary c/s for breech presentation.  Confirmed with pt and S.O that she took a hibiclens shower last night, but did not take one this morning.  Instructed to take one now.

## 2017-05-16 NOTE — INTERVAL H&P NOTE
Patient seen and examined and agree with H&P done in clinic.    30 yo  @ 39w1d with breech presentation and desire for sterility.    To the OR for primary /BTL  SCDs  Ancef

## 2017-05-16 NOTE — TRANSFER OF CARE
"Anesthesia Transfer of Care Note    Patient: Rachel Guerrero    Procedure(s) Performed: Procedure(s) (LRB):  DELIVERY- SECTION WITH TUBAL (Bilateral)    Patient location: Labor and Delivery    Anesthesia Type: spinal    Transport from OR: Transported from OR on room air with adequate spontaneous ventilation    Post pain: adequate analgesia    Post assessment: no apparent anesthetic complications and tolerated procedure well    Post vital signs: stable    Level of consciousness: awake, alert and oriented    Nausea/Vomiting: no nausea/vomiting    Complications: none    Transfer of care protocol was followed      Last vitals:   Visit Vitals    /69    Pulse 79    Temp 36.7 °C (98.1 °F) (Oral)    Resp 18    Ht 5' 5" (1.651 m)    Wt 93 kg (205 lb)    LMP 08/15/2016 (Exact Date)    SpO2 99%    Breastfeeding No    BMI 34.11 kg/m2     "

## 2017-05-17 LAB
BASOPHILS # BLD AUTO: 0.02 K/UL
BASOPHILS NFR BLD: 0.2 %
DIFFERENTIAL METHOD: ABNORMAL
EOSINOPHIL # BLD AUTO: 0.3 K/UL
EOSINOPHIL NFR BLD: 3.1 %
ERYTHROCYTE [DISTWIDTH] IN BLOOD BY AUTOMATED COUNT: 14.7 %
HCT VFR BLD AUTO: 23.7 %
HGB BLD-MCNC: 7.6 G/DL
LYMPHOCYTES # BLD AUTO: 1.2 K/UL
LYMPHOCYTES NFR BLD: 13.3 %
MCH RBC QN AUTO: 25.9 PG
MCHC RBC AUTO-ENTMCNC: 32.1 %
MCV RBC AUTO: 81 FL
MONOCYTES # BLD AUTO: 0.7 K/UL
MONOCYTES NFR BLD: 7.9 %
NEUTROPHILS # BLD AUTO: 6.5 K/UL
NEUTROPHILS NFR BLD: 74.9 %
PLATELET # BLD AUTO: 204 K/UL
PMV BLD AUTO: 11.3 FL
RBC # BLD AUTO: 2.93 M/UL
WBC # BLD AUTO: 8.7 K/UL

## 2017-05-17 PROCEDURE — 25000003 PHARM REV CODE 250: Performed by: OBSTETRICS & GYNECOLOGY

## 2017-05-17 PROCEDURE — 36415 COLL VENOUS BLD VENIPUNCTURE: CPT

## 2017-05-17 PROCEDURE — 63600175 PHARM REV CODE 636 W HCPCS: Performed by: OBSTETRICS & GYNECOLOGY

## 2017-05-17 PROCEDURE — 11000001 HC ACUTE MED/SURG PRIVATE ROOM

## 2017-05-17 PROCEDURE — 85025 COMPLETE CBC W/AUTO DIFF WBC: CPT

## 2017-05-17 RX ADMIN — CEFAZOLIN SODIUM 2 G: 2 SOLUTION INTRAVENOUS at 03:05

## 2017-05-17 RX ADMIN — OXYCODONE HYDROCHLORIDE AND ACETAMINOPHEN 1 TABLET: 10; 325 TABLET ORAL at 09:05

## 2017-05-17 RX ADMIN — DOCUSATE SODIUM 200 MG: 100 CAPSULE, LIQUID FILLED ORAL at 09:05

## 2017-05-17 RX ADMIN — IBUPROFEN 600 MG: 600 TABLET, FILM COATED ORAL at 01:05

## 2017-05-17 RX ADMIN — Medication 160 MG: at 09:05

## 2017-05-17 RX ADMIN — IBUPROFEN 600 MG: 600 TABLET, FILM COATED ORAL at 05:05

## 2017-05-17 RX ADMIN — OXYCODONE HYDROCHLORIDE AND ACETAMINOPHEN 1 TABLET: 5; 325 TABLET ORAL at 01:05

## 2017-05-17 RX ADMIN — IBUPROFEN 600 MG: 600 TABLET, FILM COATED ORAL at 06:05

## 2017-05-17 RX ADMIN — OXYCODONE HYDROCHLORIDE AND ACETAMINOPHEN 1 TABLET: 10; 325 TABLET ORAL at 03:05

## 2017-05-17 NOTE — PROGRESS NOTES
Postop day 1  Patient doing well, no complaints.  Tolerating diet    Temp:  [96.7 °F (35.9 °C)-100.7 °F (38.2 °C)]   Pulse:  [65-97]   Resp:  [18-21]   BP: (105-127)/(58-87)   SpO2:  [86 %-100 %]   Abd soft nondistended fundus firm and nontender  Incision clean, dry, intact      Recent Labs  Lab 05/17/17  0710   WBC 8.70   HGB 7.6*   HCT 23.7*          A&P  Post op doing well.  Routine post op care.

## 2017-05-17 NOTE — PLAN OF CARE
Problem: Patient Care Overview  Goal: Plan of Care Review  Outcome: Ongoing (interventions implemented as appropriate)  Plan of care reviewed with pt, all questions and concerns addressed.  Bedrest with bathroom privileges. Sesay catheter dc'd. Tolerating clear liquid diet.  Fundus firm, light lochia.  Abdominal binder in place.

## 2017-05-17 NOTE — ANESTHESIA POSTPROCEDURE EVALUATION
"Anesthesia Post Evaluation    Patient: Rachel Guerrero    Procedure(s) Performed: Procedure(s) (LRB):  DELIVERY- SECTION WITH TUBAL (Bilateral)    Final Anesthesia Type: spinal  Patient location during evaluation: labor & delivery  Patient participation: Yes- Able to Participate  Level of consciousness: awake and alert and oriented  Post-procedure vital signs: reviewed and stable  Pain management: adequate  Airway patency: patent  PONV status at discharge: No PONV  Anesthetic complications: no      Cardiovascular status: blood pressure returned to baseline and hemodynamically stable  Respiratory status: unassisted, spontaneous ventilation and room air  Hydration status: euvolemic  Follow-up not needed.        Visit Vitals    /64    Pulse 85    Temp 36.5 °C (97.7 °F)    Resp 18    Ht 5' 5" (1.651 m)    Wt 93 kg (205 lb)    LMP 08/15/2016 (Exact Date)    SpO2 96%    Breastfeeding Unknown    BMI 34.11 kg/m2       Pain/Rod Score: Pain Assessment Performed: Yes (2017  6:00 AM)  Presence of Pain: complains of pain/discomfort (2017  6:00 AM)  Pain Rating Prior to Med Admin: 8 (2017  9:02 AM)      "

## 2017-05-18 PROCEDURE — 25000003 PHARM REV CODE 250: Performed by: OBSTETRICS & GYNECOLOGY

## 2017-05-18 PROCEDURE — 63600175 PHARM REV CODE 636 W HCPCS: Performed by: OBSTETRICS & GYNECOLOGY

## 2017-05-18 PROCEDURE — 11000001 HC ACUTE MED/SURG PRIVATE ROOM

## 2017-05-18 RX ADMIN — SODIUM FERRIC GLUCONATE COMPLEX 125 MG: 12.5 INJECTION INTRAVENOUS at 11:05

## 2017-05-18 RX ADMIN — IBUPROFEN 600 MG: 600 TABLET, FILM COATED ORAL at 06:05

## 2017-05-18 RX ADMIN — DOCUSATE SODIUM 200 MG: 100 CAPSULE, LIQUID FILLED ORAL at 08:05

## 2017-05-18 RX ADMIN — IBUPROFEN 600 MG: 600 TABLET, FILM COATED ORAL at 12:05

## 2017-05-18 RX ADMIN — IBUPROFEN 600 MG: 600 TABLET, FILM COATED ORAL at 05:05

## 2017-05-18 RX ADMIN — OXYCODONE HYDROCHLORIDE AND ACETAMINOPHEN 1 TABLET: 10; 325 TABLET ORAL at 12:05

## 2017-05-18 RX ADMIN — Medication 160 MG: at 08:05

## 2017-05-18 RX ADMIN — IBUPROFEN 600 MG: 600 TABLET, FILM COATED ORAL at 11:05

## 2017-05-18 RX ADMIN — OXYCODONE HYDROCHLORIDE AND ACETAMINOPHEN 1 TABLET: 5; 325 TABLET ORAL at 08:05

## 2017-05-18 NOTE — PROGRESS NOTES
Subjective:     No complaints.  Ambulating, voiding, tolerating PO.  No major vaginal bleeding. Pain is controlled.     Review of Systems  Nine system ROS negative    Objective:   Vitals:  Temp: 97.1 °F (36.2 °C) (05/18/17 0800)  Pulse: 101 (05/18/17 0800)  Resp: 20 (05/18/17 0800)  BP: 114/65 (05/18/17 0800)  SpO2: 96 % (05/16/17 1600)  Temp:  [97.1 °F (36.2 °C)-98.1 °F (36.7 °C)]   Pulse:  []   Resp:  [16-20]   BP: ()/(54-71)   General: no acute distress, alert and oriented to person, place and time  Breasts: nontender, nonengorged  Abdomen: non-distended, appropriately tender to palpation, uterus firm and below the umbilicus, no rebound/guarding  Incision: clean/dry/intact  Pelvic: deferred, reported minimal uterine bleeding  Extremities: calves non-tender to palpation, no calf edema, erythema or palpable cords      Recent Labs  Lab 05/17/17  0710   WBC 8.70   HGB 7.6*   HCT 23.7*        Assessment:     Postoperative Day #2    Plan:     Routine postpartum care  Anemia - chronic on acute blood loss - asymptomatic - will give IV iron while in house  Will most likely discharge tomorrow

## 2017-05-18 NOTE — PLAN OF CARE
Problem: Patient Care Overview  Goal: Plan of Care Review  Outcome: Ongoing (interventions implemented as appropriate)  Plan of care reviewed with spouse and pt.  All questions and concerns addressed.

## 2017-05-18 NOTE — PLAN OF CARE
Problem: Postpartum ( Delivery) (Adult,Obstetrics,Pediatric)  Goal: Signs and Symptoms of Listed Potential Problems Will be Absent, Minimized or Managed (Postpartum)  Signs and symptoms of listed potential problems will be absent, minimized or managed by discharge/transition of care (reference Postpartum ( Delivery) (Adult,Obstetrics,Pediatric) [facility]).   Outcome: Ongoing (interventions implemented as appropriate)  Pt and infant are bonding well.  Exam WNL.  Pt up ambulating and voiding without complication. Low transverse incision CDI.  Incision care instructions given to pt; understanding verbalize.

## 2017-05-19 VITALS
SYSTOLIC BLOOD PRESSURE: 117 MMHG | HEART RATE: 85 BPM | RESPIRATION RATE: 17 BRPM | OXYGEN SATURATION: 96 % | BODY MASS INDEX: 34.16 KG/M2 | DIASTOLIC BLOOD PRESSURE: 69 MMHG | WEIGHT: 205 LBS | TEMPERATURE: 97 F | HEIGHT: 65 IN

## 2017-05-19 PROCEDURE — 90471 IMMUNIZATION ADMIN: CPT | Performed by: OBSTETRICS & GYNECOLOGY

## 2017-05-19 PROCEDURE — 25000003 PHARM REV CODE 250: Performed by: OBSTETRICS & GYNECOLOGY

## 2017-05-19 PROCEDURE — 63600175 PHARM REV CODE 636 W HCPCS: Performed by: OBSTETRICS & GYNECOLOGY

## 2017-05-19 PROCEDURE — 90715 TDAP VACCINE 7 YRS/> IM: CPT | Performed by: OBSTETRICS & GYNECOLOGY

## 2017-05-19 RX ORDER — DOCUSATE SODIUM 100 MG/1
100 CAPSULE, LIQUID FILLED ORAL DAILY PRN
Qty: 30 CAPSULE | Refills: 1 | Status: SHIPPED | OUTPATIENT
Start: 2017-05-19 | End: 2017-10-12

## 2017-05-19 RX ORDER — OXYCODONE AND ACETAMINOPHEN 5; 325 MG/1; MG/1
1 TABLET ORAL EVERY 4 HOURS PRN
Qty: 30 TABLET | Refills: 0 | Status: SHIPPED | OUTPATIENT
Start: 2017-05-19 | End: 2017-10-12

## 2017-05-19 RX ORDER — FERROUS SULFATE 325(65) MG
325 TABLET, DELAYED RELEASE (ENTERIC COATED) ORAL 2 TIMES DAILY
Qty: 60 TABLET | Refills: 3 | Status: SHIPPED | OUTPATIENT
Start: 2017-05-19 | End: 2017-10-12

## 2017-05-19 RX ORDER — IBUPROFEN 600 MG/1
600 TABLET ORAL EVERY 6 HOURS
Qty: 30 TABLET | Refills: 1 | Status: SHIPPED | OUTPATIENT
Start: 2017-05-19

## 2017-05-19 RX ADMIN — DOCUSATE SODIUM 200 MG: 100 CAPSULE, LIQUID FILLED ORAL at 10:05

## 2017-05-19 RX ADMIN — CLOSTRIDIUM TETANI TOXOID ANTIGEN (FORMALDEHYDE INACTIVATED), CORYNEBACTERIUM DIPHTHERIAE TOXOID ANTIGEN (FORMALDEHYDE INACTIVATED), BORDETELLA PERTUSSIS TOXOID ANTIGEN (GLUTARALDEHYDE INACTIVATED), BORDETELLA PERTUSSIS FILAMENTOUS HEMAGGLUTININ ANTIGEN (FORMALDEHYDE INACTIVATED), BORDETELLA PERTUSSIS PERTACTIN ANTIGEN, AND BORDETELLA PERTUSSIS FIMBRIAE 2/3 ANTIGEN 0.5 ML: 5; 2; 2.5; 5; 3; 5 INJECTION, SUSPENSION INTRAMUSCULAR at 06:05

## 2017-05-19 RX ADMIN — IBUPROFEN 600 MG: 600 TABLET, FILM COATED ORAL at 12:05

## 2017-05-19 RX ADMIN — IBUPROFEN 600 MG: 600 TABLET, FILM COATED ORAL at 06:05

## 2017-05-19 RX ADMIN — Medication 160 MG: at 10:05

## 2017-05-19 NOTE — PLAN OF CARE
Problem: Patient Care Overview  Goal: Discharge Needs Assessment  Outcome: Outcome(s) achieved Date Met:  05/19/17  Verbal and written discharge instructions discussed to patients understanding. Pt. In stable condition. Discharge pending written order.

## 2017-05-19 NOTE — DISCHARGE INSTRUCTIONS
Management of Engorgement in the Non-Nursing Mother    Your breast milk will usually come in within 3-5 days after delivery even if you have decided not to breastfeed.  Your breasts may become full, lumpy, hard and uncomfortable due to the glands filling with milk and swelling of the breast tissue, blood vessels, and lymph glands.  This is a temporary condition usually lasting 24-48 hrs.  If your breasts become uncomfortable during this time, you may use some or all of the comfort measures described below to obtain relief.    Measures to relieve discomfort:    1. Wear a well fitting supportive bra. It should not be too tight or it may lead to plugged ducts or a breast infection.  (We do not recommend that you bind your breasts with any type of wrap.)    2. If the breasts begin to feel heavy, warm or uncomfortably full, begin using cold compresses on your breasts. Cold compresses can relieve the swelling and provide comfort.  Cold application can be in the form of ice packs, gel packs, frozen bags of vegetables or frozen wet towels. Cold compresses should be wrapped in a thin towel or a pillow case and applied to the breasts for 20 minutes at a time. This process can be repeated with a short break in between the applications of cold compresses until the swelling is relieved.     3.  Cold cabbage compresses can also be used to relieve pain and swelling.  Chilled cabbage leaves show similar pain reducing effects as cold compresses.  Some mothers prefer the cabbage leaves due to a stronger, more immediate effect. Cabbage leave effects are not clinically proven but many mothers find them very soothing.    How to apply chilled cabbage compresses:  rinse with cool water and refrigerate raw cabbage leaves.  Strip the large vein off the cold cabbage leaf and put the remaining part of the cabbage leaf directly on the breast. The leaves should be worn inside the bra until they wilt, usually within 2-4 hours.  When they wilt  they should be replaced with fresh leaves.   If redness, rash, or itching occur stop use immediately.    4. Anti-inflammatory medications such as ibuprofen may be taken, with your physicians approval, to reduce inflammation and discomfort.     6. If fullness persists and remains painful even after all of the above measures are used, hand expressing a small amount of milk out of the breasts can provide an extra measure of comfort.  Warm showers, letting the warm water hit your back and roll over your shoulders to the breasts, while you gently express milk can make hand expressing a little easier. You should only remove enough milk to provide comfort and relief.   Repeat this process as often as needed to keep the breasts comfortable.    7. You can pump your breasts and remove just enough milk to feel softer, but not so much that more milk production is stimulated.   Repeat this process as often as needed to keep the breasts comfortable.    8. There is no need to limit the amount of fluids that you drink.       9. Engorgement will usually get better within 24-48 hrs; however, there may be some fullness and/or leaking of milk for several days. Wear breast pads to absorb any leaking milk and change them frequently.    10. If you have any flu-like symptoms such as fever, chills, feeling tired and achy or red areas on your breast, call your physician immediately.  11.Call the [facility], if the engorgement is not relieved or if you have questions.

## 2017-05-19 NOTE — PROGRESS NOTES
Delivery Progress Note  Obstetrics        SUBJECTIVE:     Postpartum Day 3:  Delivery    Ms. Guerrero states she feels well. She denies emotional concerns. Her pain is well controlled with current medications. The baby is well. The baby is feeding via formula?. The patient is ambulating well. Ms. Guerrero is tolerating a normal diet. Flatus has been passed. Urinary output is adequate.    OBJECTIVE:     Vital Signs (Most Recent):  Temp: 97 °F (36.1 °C) (17 0800)  Pulse: 85 (17 0800)  Resp: 17 (17 0800)  BP: 117/69 (17 0800)  SpO2: 96 % (17 1600)    Vital Signs Range (Last 24H):  Temp:  [96.9 °F (36.1 °C)-97.9 °F (36.6 °C)]   Pulse:  [78-97]   Resp:  [17-20]   BP: (104-119)/(55-73)     I & O (Last 24H):No intake or output data in the 24 hours ending 17 1041  Physical Exam:  General:    no distress   Lungs:  clear to auscultation bilaterally   Heart:  regular rate and rhythm, S1, S2 normal, no murmur, click, rub or gallop   Breasts:  no discharge, erythema, or tenderness   Abdomen:  soft, non-tender; bowel sounds normal   Fundus:  firm   Incision:  healing well   Lochia:   scant rubra   DVT Evaluation:  No evidence of DVT on either side seen on physical exam.     Hemoglobin/Hematocrit    Recent Labs  Lab 17  0710   HGB 7.6*   HCT 23.7*     ABO/Rh  Lab Results   Component Value Date    GROUPTRH O POS 2017     Rubella  No results for input(s): RUBELLAIGGSC in the last 168 hours.    ASSESSMENT/PLAN:     Status post  section. Doing well postoperatively.     Discharge home with standard precautions and return to clinic in one week with MD Omar Guillen MD

## 2017-05-19 NOTE — PROGRESS NOTES
SW met wit pt at bedside. Pt doing well. Pt will be bottlefeeding and is linked to [facility]. SW inquired if pt has help at home. Pt stated that she has help at home with her SO and family. SW informed pt of signs and symptom to look out for once home in order to be responsible in managing health at home. Teachback method used with pt. SW reiterated the importance of following up with appointments once discharged. OB is Rizwan and Peds is [facility]. SW reiterated the importance of following up with appointments. Pt voice understanding. Pt has no questions or concerns. SW will assist as needed. .

## 2017-05-19 NOTE — DISCHARGE SUMMARY
Delivery Discharge Summary  Obstetrics      Principle diagnosis:  Pregnancy     Conditions: N/A    Hospital Course: Patient was admitted underwent a  section and tubal ligation due to h/o prev c/s and undesired fertility. .  Her post-operative was uneventful.    Delivery:    Episiotomy: None   Lacerations:     Repair suture: None   Repair # of packets:     Blood loss (ml): 0     Birth information:  YOB: 2015   Time of birth: 10:04 AM   Sex: female   Delivery type: , Low Transverse   Gestational Age: 39w1d    Delivery Clinician:      Other providers:       Additional  information:  Forceps:    Vacuum:    Breech:    Observed anomalies      Living?:           APGARS  One minute Five minutes Ten minutes   Skin color:         Heart rate:         Grimace:         Muscle tone:         Breathing:         Totals: 7  9        Placenta: Delivered:       appearance      Feeding Method: bottle    Rh Immune Globulin Given: no  Rubella Vaccine Given: no  Tdap Vaccine Given: yes    Discharge Date: 2017    Follow-up 1 week    Disposition:  Home    Patient Instructions:   Current Discharge Medication List      CONTINUE these medications which have NOT CHANGED    Details   prenatal vit#103-iron-FA () 27 mg iron- 1 mg Tab Take 1 tablet by mouth once daily.  Qty: 30 tablet, Refills: 12             No discharge procedures on file.     Pelvic rest x 6 weeks.      No heavy lifting    Regular diet    No other physical restrictions.      Follow up with Dr. Astorga in one week for an incision check.      Omar Monahan MD

## (undated) DEVICE — PAD ABD 8X10 STERILE